# Patient Record
Sex: FEMALE | Race: WHITE | Employment: FULL TIME | URBAN - METROPOLITAN AREA
[De-identification: names, ages, dates, MRNs, and addresses within clinical notes are randomized per-mention and may not be internally consistent; named-entity substitution may affect disease eponyms.]

---

## 2024-08-26 PROBLEM — M79.7 FIBROMYALGIA: Status: ACTIVE | Noted: 2024-08-26

## 2024-08-26 PROBLEM — E55.9 VITAMIN D DEFICIENCY: Status: ACTIVE | Noted: 2024-08-26

## 2024-08-26 PROBLEM — E78.5 HYPERLIPIDEMIA: Status: ACTIVE | Noted: 2024-08-26

## 2024-08-26 PROBLEM — R53.83 OTHER FATIGUE: Status: ACTIVE | Noted: 2024-08-26

## 2024-08-26 PROBLEM — E53.8 LOW SERUM VITAMIN B12: Status: ACTIVE | Noted: 2024-08-26

## 2024-08-26 PROBLEM — R73.09 OTHER ABNORMAL GLUCOSE: Status: ACTIVE | Noted: 2024-08-26

## 2024-09-18 ENCOUNTER — OFFICE VISIT (OUTPATIENT)
Dept: PODIATRY | Age: 54
End: 2024-09-18
Payer: MEDICARE

## 2024-09-18 VITALS — HEIGHT: 66 IN | WEIGHT: 142 LBS | BODY MASS INDEX: 22.82 KG/M2

## 2024-09-18 DIAGNOSIS — M79.674 PAIN OF TOE OF RIGHT FOOT: ICD-10-CM

## 2024-09-18 DIAGNOSIS — L60.0 OC (ONYCHOCRYPTOSIS): Primary | ICD-10-CM

## 2024-09-18 PROCEDURE — 11750 EXCISION NAIL&NAIL MATRIX: CPT | Performed by: PODIATRIST

## 2024-09-18 PROCEDURE — 99203 OFFICE O/P NEW LOW 30 MIN: CPT | Performed by: PODIATRIST

## 2024-09-25 ENCOUNTER — OFFICE VISIT (OUTPATIENT)
Dept: PODIATRY | Age: 54
End: 2024-09-25

## 2024-09-25 VITALS — HEIGHT: 66 IN | WEIGHT: 142 LBS | BODY MASS INDEX: 22.82 KG/M2

## 2024-09-25 DIAGNOSIS — L60.0 OC (ONYCHOCRYPTOSIS): Primary | ICD-10-CM

## 2024-09-25 PROCEDURE — 99024 POSTOP FOLLOW-UP VISIT: CPT | Performed by: PODIATRIST

## 2024-10-17 ENCOUNTER — OFFICE VISIT (OUTPATIENT)
Dept: CARDIOLOGY CLINIC | Age: 54
End: 2024-10-17
Payer: MEDICARE

## 2024-10-17 VITALS
HEART RATE: 63 BPM | BODY MASS INDEX: 23.3 KG/M2 | SYSTOLIC BLOOD PRESSURE: 98 MMHG | HEIGHT: 66 IN | RESPIRATION RATE: 16 BRPM | DIASTOLIC BLOOD PRESSURE: 62 MMHG | WEIGHT: 145 LBS

## 2024-10-17 DIAGNOSIS — F12.90 MARIJUANA USE: ICD-10-CM

## 2024-10-17 DIAGNOSIS — E55.9 VITAMIN D DEFICIENCY: ICD-10-CM

## 2024-10-17 DIAGNOSIS — Z72.0 TOBACCO ABUSE: ICD-10-CM

## 2024-10-17 DIAGNOSIS — R06.09 DYSPNEA ON EXERTION: Primary | ICD-10-CM

## 2024-10-17 DIAGNOSIS — M19.90 ARTHRITIS: ICD-10-CM

## 2024-10-17 DIAGNOSIS — E78.00 HYPERCHOLESTEREMIA: ICD-10-CM

## 2024-10-17 DIAGNOSIS — Z98.1 HISTORY OF LUMBAR SPINAL FUSION: ICD-10-CM

## 2024-10-17 DIAGNOSIS — M79.7 FIBROMYALGIA: ICD-10-CM

## 2024-10-17 DIAGNOSIS — Z90.49 HISTORY OF CHOLECYSTECTOMY: ICD-10-CM

## 2024-10-17 PROCEDURE — 99205 OFFICE O/P NEW HI 60 MIN: CPT | Performed by: INTERNAL MEDICINE

## 2024-10-17 PROCEDURE — 93000 ELECTROCARDIOGRAM COMPLETE: CPT | Performed by: INTERNAL MEDICINE

## 2024-10-18 ENCOUNTER — TELEPHONE (OUTPATIENT)
Dept: CARDIOLOGY | Age: 54
End: 2024-10-18

## 2024-10-18 NOTE — TELEPHONE ENCOUNTER
Attempted to reach pt to schedule appointments for stress test and echo. Left VM with return phone number for pt to call back.

## 2024-11-12 ENCOUNTER — TELEPHONE (OUTPATIENT)
Dept: CARDIOLOGY | Age: 54
End: 2024-11-12

## 2024-11-12 NOTE — TELEPHONE ENCOUNTER
Attempt to reach the patient was unsuccessful. Left a VM reminding her of her scheduled stress test appointment and where to report. She was instructed on NPO after MN except meds with water but to avoid caffeine for 12 hours prior. Phone number provided to return call if any questions or to cancel.

## 2024-11-14 ENCOUNTER — HOSPITAL ENCOUNTER (OUTPATIENT)
Dept: CARDIOLOGY | Age: 54
Discharge: HOME | End: 2024-11-16
Attending: INTERNAL MEDICINE
Payer: MEDICARE

## 2024-11-14 VITALS
WEIGHT: 145 LBS | BODY MASS INDEX: 23.3 KG/M2 | SYSTOLIC BLOOD PRESSURE: 98 MMHG | DIASTOLIC BLOOD PRESSURE: 62 MMHG | HEIGHT: 66 IN

## 2024-11-14 VITALS
BODY MASS INDEX: 23.3 KG/M2 | HEIGHT: 66 IN | DIASTOLIC BLOOD PRESSURE: 69 MMHG | RESPIRATION RATE: 16 BRPM | SYSTOLIC BLOOD PRESSURE: 104 MMHG | WEIGHT: 145 LBS | HEART RATE: 62 BPM

## 2024-11-14 DIAGNOSIS — R06.09 DYSPNEA ON EXERTION: ICD-10-CM

## 2024-11-14 LAB
ECHO AV MEAN GRADIENT: 2 MMHG
ECHO AV MEAN VELOCITY: 0.7 M/S
ECHO AV PEAK GRADIENT: 5 MMHG
ECHO AV PEAK VELOCITY: 1.1 M/S
ECHO AV VTI: 23.4 CM
ECHO BSA: 1.75 M2
ECHO BSA: 1.75 M2
ECHO LA DIAMETER INDEX: 1.38 CM/M2
ECHO LA DIAMETER: 2.4 CM
ECHO LA VOL A-L A2C: 33 ML (ref 22–52)
ECHO LA VOL A-L A4C: 28 ML (ref 22–52)
ECHO LA VOL MOD A2C: 32 ML (ref 22–52)
ECHO LA VOL MOD A4C: 28 ML (ref 22–52)
ECHO LA VOLUME AREA LENGTH: 33 ML
ECHO LA VOLUME INDEX A-L A2C: 19 ML/M2 (ref 16–34)
ECHO LA VOLUME INDEX A-L A4C: 16 ML/M2 (ref 16–34)
ECHO LA VOLUME INDEX AREA LENGTH: 19 ML/M2 (ref 16–34)
ECHO LA VOLUME INDEX MOD A2C: 18 ML/M2 (ref 16–34)
ECHO LA VOLUME INDEX MOD A4C: 16 ML/M2 (ref 16–34)
ECHO LV EF PHYSICIAN: 60 %
ECHO LV FRACTIONAL SHORTENING: 28 % (ref 28–44)
ECHO LV INTERNAL DIMENSION DIASTOLE INDEX: 2.47 CM/M2
ECHO LV INTERNAL DIMENSION DIASTOLIC: 4.3 CM (ref 3.9–5.3)
ECHO LV INTERNAL DIMENSION SYSTOLIC INDEX: 1.78 CM/M2
ECHO LV INTERNAL DIMENSION SYSTOLIC: 3.1 CM
ECHO LV IVSD: 0.9 CM (ref 0.6–0.9)
ECHO LV MASS 2D: 123.3 G (ref 67–162)
ECHO LV MASS INDEX 2D: 70.9 G/M2 (ref 43–95)
ECHO LV POSTERIOR WALL DIASTOLIC: 0.9 CM (ref 0.6–0.9)
ECHO LV RELATIVE WALL THICKNESS RATIO: 0.42
ECHO MV A VELOCITY: 0.82 M/S
ECHO MV E DECELERATION TIME (DT): 233.3 MS
ECHO MV E VELOCITY: 0.58 M/S
ECHO MV E/A RATIO: 0.71
ECHO MV MAX VELOCITY: 0.8 M/S
ECHO MV MEAN GRADIENT: 1 MMHG
ECHO MV MEAN VELOCITY: 0.4 M/S
ECHO MV PEAK GRADIENT: 3 MMHG
ECHO MV VTI: 20.8 CM
ECHO RV INTERNAL DIMENSION: 2.1 CM
NUC STRESS EJECTION FRACTION: 67 %
STRESS BASELINE DIAS BP: 69 MMHG
STRESS BASELINE HR: 63 BPM
STRESS BASELINE SYS BP: 104 MMHG
STRESS ESTIMATED WORKLOAD: 8.4 METS
STRESS EXERCISE DUR MIN: 6 MIN
STRESS EXERCISE DUR SEC: 30 SEC
STRESS O2 SAT PEAK: 94 %
STRESS O2 SAT REST: 98 %
STRESS PEAK DIAS BP: 89 MMHG
STRESS PEAK SYS BP: 162 MMHG
STRESS PERCENT HR ACHIEVED: 92 %
STRESS POST PEAK HR: 153 BPM
STRESS RATE PRESSURE PRODUCT: NORMAL BPM*MMHG
STRESS TARGET HR: 166 BPM

## 2024-11-14 PROCEDURE — 3430000000 HC RX DIAGNOSTIC RADIOPHARMACEUTICAL: Performed by: INTERNAL MEDICINE

## 2024-11-14 PROCEDURE — A9500 TC99M SESTAMIBI: HCPCS | Performed by: INTERNAL MEDICINE

## 2024-11-14 PROCEDURE — 2580000003 HC RX 258: Performed by: INTERNAL MEDICINE

## 2024-11-14 PROCEDURE — 93306 TTE W/DOPPLER COMPLETE: CPT

## 2024-11-14 PROCEDURE — 93017 CV STRESS TEST TRACING ONLY: CPT

## 2024-11-14 RX ORDER — TETRAKIS(2-METHOXYISOBUTYLISOCYANIDE)COPPER(I) TETRAFLUOROBORATE 1 MG/ML
10.5 INJECTION, POWDER, LYOPHILIZED, FOR SOLUTION INTRAVENOUS
Status: COMPLETED | OUTPATIENT
Start: 2024-11-14 | End: 2024-11-14

## 2024-11-14 RX ORDER — TETRAKIS(2-METHOXYISOBUTYLISOCYANIDE)COPPER(I) TETRAFLUOROBORATE 1 MG/ML
33.3 INJECTION, POWDER, LYOPHILIZED, FOR SOLUTION INTRAVENOUS
Status: COMPLETED | OUTPATIENT
Start: 2024-11-14 | End: 2024-11-14

## 2024-11-14 RX ORDER — SODIUM CHLORIDE 0.9 % (FLUSH) 0.9 %
10 SYRINGE (ML) INJECTION PRN
Status: DISCONTINUED | OUTPATIENT
Start: 2024-11-14 | End: 2024-11-17 | Stop reason: HOSPADM

## 2024-11-14 RX ADMIN — SODIUM CHLORIDE, PRESERVATIVE FREE 10 ML: 5 INJECTION INTRAVENOUS at 11:10

## 2024-11-14 RX ADMIN — SODIUM CHLORIDE, PRESERVATIVE FREE 10 ML: 5 INJECTION INTRAVENOUS at 11:42

## 2024-11-14 RX ADMIN — Medication 33.3 MILLICURIE: at 10:17

## 2024-11-14 RX ADMIN — SODIUM CHLORIDE, PRESERVATIVE FREE 10 ML: 5 INJECTION INTRAVENOUS at 11:12

## 2024-11-14 RX ADMIN — SODIUM CHLORIDE, PRESERVATIVE FREE 10 ML: 5 INJECTION INTRAVENOUS at 11:40

## 2024-11-14 RX ADMIN — SODIUM CHLORIDE, PRESERVATIVE FREE 10 ML: 5 INJECTION INTRAVENOUS at 11:14

## 2024-11-14 RX ADMIN — SODIUM CHLORIDE, PRESERVATIVE FREE 10 ML: 5 INJECTION INTRAVENOUS at 11:16

## 2024-11-14 RX ADMIN — Medication 10.5 MILLICURIE: at 08:15

## 2024-12-24 ENCOUNTER — OFFICE VISIT (OUTPATIENT)
Dept: CARDIOLOGY CLINIC | Age: 54
End: 2024-12-24
Payer: MEDICARE

## 2024-12-24 VITALS
WEIGHT: 140 LBS | BODY MASS INDEX: 22.5 KG/M2 | HEART RATE: 58 BPM | HEIGHT: 66 IN | SYSTOLIC BLOOD PRESSURE: 102 MMHG | RESPIRATION RATE: 16 BRPM | DIASTOLIC BLOOD PRESSURE: 54 MMHG

## 2024-12-24 DIAGNOSIS — Z90.49 HISTORY OF CHOLECYSTECTOMY: ICD-10-CM

## 2024-12-24 DIAGNOSIS — M79.7 FIBROMYALGIA: ICD-10-CM

## 2024-12-24 DIAGNOSIS — F12.90 MARIJUANA USE: ICD-10-CM

## 2024-12-24 DIAGNOSIS — R06.09 DYSPNEA ON EXERTION: Primary | ICD-10-CM

## 2024-12-24 DIAGNOSIS — E78.00 HYPERCHOLESTEREMIA: ICD-10-CM

## 2024-12-24 DIAGNOSIS — Z72.0 TOBACCO ABUSE: ICD-10-CM

## 2024-12-24 DIAGNOSIS — M19.90 ARTHRITIS: ICD-10-CM

## 2024-12-24 DIAGNOSIS — E55.9 VITAMIN D DEFICIENCY: ICD-10-CM

## 2024-12-24 DIAGNOSIS — Z98.1 HISTORY OF LUMBAR SPINAL FUSION: ICD-10-CM

## 2024-12-24 PROCEDURE — 3017F COLORECTAL CA SCREEN DOC REV: CPT | Performed by: INTERNAL MEDICINE

## 2024-12-24 PROCEDURE — 93000 ELECTROCARDIOGRAM COMPLETE: CPT | Performed by: INTERNAL MEDICINE

## 2024-12-24 PROCEDURE — 4004F PT TOBACCO SCREEN RCVD TLK: CPT | Performed by: INTERNAL MEDICINE

## 2024-12-24 PROCEDURE — G8427 DOCREV CUR MEDS BY ELIG CLIN: HCPCS | Performed by: INTERNAL MEDICINE

## 2024-12-24 PROCEDURE — 99215 OFFICE O/P EST HI 40 MIN: CPT | Performed by: INTERNAL MEDICINE

## 2024-12-24 PROCEDURE — G8420 CALC BMI NORM PARAMETERS: HCPCS | Performed by: INTERNAL MEDICINE

## 2024-12-24 PROCEDURE — G8484 FLU IMMUNIZE NO ADMIN: HCPCS | Performed by: INTERNAL MEDICINE

## 2024-12-24 NOTE — PROGRESS NOTES
9.1 08/26/2024    BILITOT 0.5 08/26/2024    ALKPHOS 75 08/26/2024    AST 20 08/26/2024    ALT 11 08/26/2024    LABGLOM >90 08/26/2024    GFRAA 124 10/22/2019    GLOB 2.0 10/22/2019     Other labs from 8/26/2024 reviewed in epic: Vitamin D 25-hydroxy 22 TSH 2.55 total cholesterol 239 HDL 45  triglycerides 125    EKG done today: Sinus bradycardia with a heart rate of 58 bpm. RSR' in V1. Otherwise unremarkable     ASSESSMENT / DIAGNOSIS:   1.  Dyspnea on exertion.  Negative stress test for ischemia.  Unremarkable echocardiogram  2.  Tobacco abuse  3.  Hypercholesterolemia.  Apparently had not tolerated statins in the past  4.  No evidence of PFO  5.  Arthritis  6.  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  Fibromyalgia  8.  Anxiety  9.  Marijuana use  10.  History of cholecystectomy     TREATMENT PLAN:  1.  Reassure  2.  Patient strongly advised smoking cessation  3.  Patient should be on cholesterol-lowering agents.  Consider retrying statins.  Consider other cholesterol-lowering agents if statins are not tolerated.  Will defer to her PCP   4.  Patient advised aerobic exercise and following a low-fat diet  5.  Follow-up with cardiology in 1 year or on an as-needed basis       45 minutes of time was spent on today's encounter reviewing medical records including those from [place] and including old and recent cardiac diagnostic studies. Obtaining history and physical. Making recommendations and in preparing today's document      [facility]  [address] [place] [id number] [facility]. [facility] [id number]  [phone]

## 2025-07-03 NOTE — PROGRESS NOTES
Patient called requesting lab results.  Enterobacter cloacae detected. Bactrim sent.   abuse  3.  Hypercholesterolemia  4.?  PFO by history  5.  Arthritis  6.  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  Fibromyalgia  8.  Anxiety  9.  Marijuana use  10.  History of cholecystectomy     TREATMENT PLAN:  1.  Obtain cardiology records from [place]  2.  Patient strongly advised smoking cessation  3.  Should be on statins  4.  Treadmill exercise nuclear stress test  5.  Echocardiogram  6.  Further recommendations to follow  7.  Tentative follow-up in 8 weeks       65 minutes of time was spent on today's encounter reviewing medical records, obtaining history and physical, making recommendations and in preparing today's document      [facility]  [facility] [id number] [facility]. [facility] [address]  [phone]